# Patient Record
Sex: MALE | Race: ASIAN | NOT HISPANIC OR LATINO | Employment: FULL TIME | URBAN - METROPOLITAN AREA
[De-identification: names, ages, dates, MRNs, and addresses within clinical notes are randomized per-mention and may not be internally consistent; named-entity substitution may affect disease eponyms.]

---

## 2018-10-03 ENCOUNTER — OFFICE VISIT (OUTPATIENT)
Dept: URGENT CARE | Facility: PHYSICIAN GROUP | Age: 58
End: 2018-10-03

## 2018-10-03 VITALS
BODY MASS INDEX: 23.39 KG/M2 | HEART RATE: 64 BPM | HEIGHT: 67 IN | OXYGEN SATURATION: 99 % | SYSTOLIC BLOOD PRESSURE: 140 MMHG | TEMPERATURE: 98.1 F | RESPIRATION RATE: 16 BRPM | DIASTOLIC BLOOD PRESSURE: 82 MMHG | WEIGHT: 149 LBS

## 2018-10-03 DIAGNOSIS — L03.116 CELLULITIS OF LEFT LOWER LEG: ICD-10-CM

## 2018-10-03 DIAGNOSIS — L30.9 ACUTE DERMATITIS: ICD-10-CM

## 2018-10-03 PROCEDURE — 99204 OFFICE O/P NEW MOD 45 MIN: CPT | Performed by: NURSE PRACTITIONER

## 2018-10-03 RX ORDER — CLINDAMYCIN HYDROCHLORIDE 300 MG/1
300 CAPSULE ORAL 3 TIMES DAILY
Qty: 30 CAP | Refills: 0 | Status: SHIPPED | OUTPATIENT
Start: 2018-10-03 | End: 2018-10-13

## 2018-10-03 RX ORDER — TRIAMCINOLONE ACETONIDE 1 MG/G
1 OINTMENT TOPICAL 2 TIMES DAILY
Qty: 1 TUBE | Refills: 0 | Status: SHIPPED | OUTPATIENT
Start: 2018-10-03 | End: 2021-11-26

## 2018-10-03 ASSESSMENT — ENCOUNTER SYMPTOMS
PSYCHIATRIC NEGATIVE: 1
CARDIOVASCULAR NEGATIVE: 1
NEUROLOGICAL NEGATIVE: 1
CONSTITUTIONAL NEGATIVE: 1
GASTROINTESTINAL NEGATIVE: 1

## 2018-10-03 NOTE — PROGRESS NOTES
"Subjective:      Brendon Roberts is a 58 y.o. male who presents with Rash (x3days all overbody, itchy, wound L leg)        HPI    The patient presents to urgent care today with complaints of a wound to his left lower leg for one month. He was scratching his leg and has now turned into a wound. He is concerned it is infected. He denies fever, chills, body aches, or malaise. He has been cleaning daily and covering. He also admits to a rash to his body since onset, to entire body. He denies new foods. He has new shampoo and has tried changing it. He has tried lotion for the rash. Denies sore throat, chest pain, nausea. He otherwise feels well.     History reviewed. No pertinent past medical history.  History reviewed. No pertinent surgical history.   No current outpatient prescriptions on file prior to visit.     No current facility-administered medications on file prior to visit.      Patient has no known allergies.    Review of Systems   Constitutional: Negative.    HENT: Negative.    Cardiovascular: Negative.    Gastrointestinal: Negative.    Genitourinary: Negative.    Skin: Positive for itching and rash.   Neurological: Negative.    Endo/Heme/Allergies: Negative.    Psychiatric/Behavioral: Negative.           Objective:     /82 (BP Location: Left arm)   Pulse 64   Temp 36.7 °C (98.1 °F) (Temporal)   Resp 16   Ht 1.702 m (5' 7\")   Wt 67.6 kg (149 lb)   SpO2 99%   BMI 23.34 kg/m²      Physical Exam   Constitutional: He is oriented to person, place, and time. Vital signs are normal. He appears well-developed and well-nourished. He is active. He does not have a sickly appearance. He does not appear ill. No distress.   HENT:   Head: Normocephalic and atraumatic.   Right Ear: External ear normal.   Left Ear: External ear normal.   Nose: Nose normal.   Mouth/Throat: Oropharynx is clear and moist.   Eyes: Pupils are equal, round, and reactive to light. Conjunctivae are normal. Right eye exhibits no " discharge. Left eye exhibits no discharge.   Neck: Normal range of motion. Neck supple. No JVD present. No tracheal deviation present.   Cardiovascular: Normal rate, regular rhythm, normal heart sounds and intact distal pulses.    Pulmonary/Chest: Effort normal and breath sounds normal. No stridor. No respiratory distress. He has no wheezes.   Musculoskeletal: Normal range of motion. He exhibits tenderness. He exhibits no edema.        Left lower leg: He exhibits tenderness and swelling. He exhibits no bony tenderness, no edema and no deformity.   Lymphadenopathy:     He has no cervical adenopathy.   Neurological: He is alert and oriented to person, place, and time. He has normal strength. No cranial nerve deficit or sensory deficit. He exhibits normal muscle tone. Coordination and gait normal. GCS eye subscore is 4. GCS verbal subscore is 5. GCS motor subscore is 6.   Skin: Skin is warm and intact. Capillary refill takes less than 2 seconds. Rash noted. Rash is maculopapular (scattered to arms, legs, and trunk). He is not diaphoretic. There is erythema. No pallor.        Psychiatric: He has a normal mood and affect. His behavior is normal. Judgment and thought content normal.   Vitals reviewed.              Assessment/Plan:     1. Cellulitis of left lower leg  clindamycin (CLEOCIN) 300 MG Cap   2. Acute dermatitis  triamcinolone acetonide (KENALOG) 0.1 % Ointment           Suspect dermatitis with secondary cellulitis. Refrain from itching. Eliminate new foods/environmental factors which may be cause of allergy. Clindamycin as directed, probiotic use encouraged. Wound care. RTC in 48 hours for re-eval. Kenalog as directed. Supportive care, differential diagnoses, and indications for immediate follow-up discussed with patient.   Pathogenesis of diagnosis discussed including typical length and natural progression.   Instructed to return to clinic or nearest emergency department for any change in condition, further  concerns, or worsening of symptoms.  Patient states understanding of the plan of care and discharge instructions.  Instructed to make an appointment, for follow up, with his primary care provider.        JOSEFA Bazzi.

## 2018-10-03 NOTE — PATIENT INSTRUCTIONS
Cellulitis, Adult  Introduction  Cellulitis is a skin infection. The infected area is usually red and sore. This condition occurs most often in the arms and lower legs. It is very important to get treated for this condition.  Follow these instructions at home:  · Take over-the-counter and prescription medicines only as told by your doctor.  · If you were prescribed an antibiotic medicine, take it as told by your doctor. Do not stop taking the antibiotic even if you start to feel better.  · Drink enough fluid to keep your pee (urine) clear or pale yellow.  · Do not touch or rub the infected area.  · Raise (elevate) the infected area above the level of your heart while you are sitting or lying down.  · Place warm or cold wet cloths (warm or cold compresses) on the infected area. Do this as told by your doctor.  · Keep all follow-up visits as told by your doctor. This is important. These visits let your doctor make sure your infection is not getting worse.  Contact a doctor if:  · You have a fever.  · Your symptoms do not get better after 1-2 days of treatment.  · Your bone or joint under the infected area starts to hurt after the skin has healed.  · Your infection comes back. This can happen in the same area or another area.  · You have a swollen bump in the infected area.  · You have new symptoms.  · You feel ill and also have muscle aches and pains.  Get help right away if:  · Your symptoms get worse.  · You feel very sleepy.  · You throw up (vomit) or have watery poop (diarrhea) for a long time.  · There are red streaks coming from the infected area.  · Your red area gets larger.  · Your red area turns darker.  This information is not intended to replace advice given to you by your health care provider. Make sure you discuss any questions you have with your health care provider.  Document Released: 06/05/2009 Document Revised: 05/25/2017 Document Reviewed: 10/26/2016  © 2017 Elsevier      -Take the antibiotic as  directed (Clindamycin)  -You should take a probiotic in between one of the doses      Clindamycin capsules  What is this medicine?  CLINDAMYCIN (MARILYNN Stewart) is a lincosamide antibiotic. It is used to treat certain kinds of bacterial infections. It will not work for colds, flu, or other viral infections.  This medicine may be used for other purposes; ask your health care provider or pharmacist if you have questions.  COMMON BRAND NAME(S): Cleocin  What should I tell my health care provider before I take this medicine?  They need to know if you have any of these conditions:  -kidney disease  -liver disease  -stomach problems like colitis  -an unusual or allergic reaction to clindamycin, lincomycin, or other medicines, foods, dyes like tartrazine or preservatives  -pregnant or trying to get pregnant  -breast-feeding  How should I use this medicine?  Take this medicine by mouth with a full glass of water. Follow the directions on the prescription label. You can take this medicine with food or on an empty stomach. If the medicine upsets your stomach, take it with food. Take your medicine at regular intervals. Do not take your medicine more often than directed. Take all of your medicine as directed even if you think your are better. Do not skip doses or stop your medicine early.  Talk to your pediatrician regarding the use of this medicine in children. Special care may be needed.  Overdosage: If you think you have taken too much of this medicine contact a poison control center or emergency room at once.  NOTE: This medicine is only for you. Do not share this medicine with others.  What if I miss a dose?  If you miss a dose, take it as soon as you can. If it is almost time for your next dose, take only that dose. Do not take double or extra doses.  What may interact with this medicine?  -birth control pills  -erythromycin  -medicines that relax muscles for surgery  -rifampin  This list may not describe all possible  interactions. Give your health care provider a list of all the medicines, herbs, non-prescription drugs, or dietary supplements you use. Also tell them if you smoke, drink alcohol, or use illegal drugs. Some items may interact with your medicine.  What should I watch for while using this medicine?  Tell your doctor or healthcare professional if your symptoms do not start to get better or if they get worse.  Do not treat diarrhea with over the counter products. Contact your doctor if you have diarrhea that lasts more than 2 days or if it is severe and watery.  What side effects may I notice from receiving this medicine?  Side effects that you should report to your doctor or health care professional as soon as possible:  -allergic reactions like skin rash, itching or hives, swelling of the face, lips, or tongue  -dark urine  -pain on swallowing  -redness, blistering, peeling or loosening of the skin, including inside the mouth  -unusual bleeding or bruising  -unusually weak or tired  -yellowing of eyes or skin  Side effects that usually do not require medical attention (report to your doctor or health care professional if they continue or are bothersome):  -diarrhea  -itching in the rectal or genital area  -joint pain  -nausea, vomiting  -stomach pain  This list may not describe all possible side effects. Call your doctor for medical advice about side effects. You may report side effects to FDA at 3-963-FDA-3045.  Where should I keep my medicine?  Keep out of the reach of children.  Store at room temperature between 20 and 25 degrees C (68 and 77 degrees F). Throw away any unused medicine after the expiration date.  NOTE: This sheet is a summary. It may not cover all possible information. If you have questions about this medicine, talk to your doctor, pharmacist, or health care provider.  © 2018 Elsevier/Gold Standard (2017-03-22 16:34:00)

## 2019-01-02 ENCOUNTER — OFFICE VISIT (OUTPATIENT)
Dept: URGENT CARE | Facility: PHYSICIAN GROUP | Age: 59
End: 2019-01-02
Payer: COMMERCIAL

## 2019-01-02 VITALS
TEMPERATURE: 98.8 F | HEART RATE: 111 BPM | BODY MASS INDEX: 23.07 KG/M2 | HEIGHT: 67 IN | RESPIRATION RATE: 22 BRPM | WEIGHT: 147 LBS | SYSTOLIC BLOOD PRESSURE: 120 MMHG | DIASTOLIC BLOOD PRESSURE: 82 MMHG | OXYGEN SATURATION: 98 %

## 2019-01-02 DIAGNOSIS — R42 DIZZINESS: ICD-10-CM

## 2019-01-02 LAB
APPEARANCE UR: NORMAL
BILIRUB UR STRIP-MCNC: NORMAL MG/DL
COLOR UR AUTO: NORMAL
GLUCOSE BLD-MCNC: 105 MG/DL (ref 70–100)
GLUCOSE UR STRIP.AUTO-MCNC: NEGATIVE MG/DL
KETONES UR STRIP.AUTO-MCNC: NORMAL MG/DL
LEUKOCYTE ESTERASE UR QL STRIP.AUTO: NEGATIVE
NITRITE UR QL STRIP.AUTO: NEGATIVE
PH UR STRIP.AUTO: 5.5 [PH] (ref 5–8)
PROT UR QL STRIP: NORMAL MG/DL
RBC UR QL AUTO: NORMAL
SP GR UR STRIP.AUTO: 1.02
UROBILINOGEN UR STRIP-MCNC: 0.2 MG/DL

## 2019-01-02 PROCEDURE — 81002 URINALYSIS NONAUTO W/O SCOPE: CPT | Performed by: FAMILY MEDICINE

## 2019-01-02 PROCEDURE — 82962 GLUCOSE BLOOD TEST: CPT | Performed by: FAMILY MEDICINE

## 2019-01-02 PROCEDURE — 99213 OFFICE O/P EST LOW 20 MIN: CPT | Performed by: FAMILY MEDICINE

## 2019-01-02 RX ORDER — METFORMIN HYDROCHLORIDE 500 MG/1
1000 TABLET, EXTENDED RELEASE ORAL 2 TIMES DAILY
Refills: 0 | COMMUNITY
Start: 2018-12-13 | End: 2021-11-26

## 2019-01-02 NOTE — LETTER
January 2, 2019         Patient: Brendon Roberts   YOB: 1960   Date of Visit: 1/2/2019           To Whom it May Concern:    Brendon Roberts was seen in my clinic on 1/2/2019. He may return to work on 1/5/2018 unless improved prior..    If you have any questions or concerns, please don't hesitate to call.        Sincerely,           Shaw Oneil M.D.  Electronically Signed

## 2019-01-15 ASSESSMENT — ENCOUNTER SYMPTOMS
CHILLS: 1
VOMITING: 0
SORE THROAT: 0
NAUSEA: 1
FEVER: 0

## 2019-01-15 NOTE — PROGRESS NOTES
"Subjective:   Brendon Roberts is a 58 y.o. male who presents for Chills (x this morning// dizzy// sweating// slight nausea)        Chills   This is a new problem. The current episode started today. The problem occurs constantly. The problem has been rapidly improving. Associated symptoms include chills and nausea. Pertinent negatives include no fever, rash, sore throat or vomiting.     Review of Systems   Constitutional: Positive for chills. Negative for fever.   HENT: Negative for sore throat.    Gastrointestinal: Positive for nausea. Negative for vomiting.   Skin: Negative for rash.     No Known Allergies   Objective:   /82 (BP Location: Left arm, Patient Position: Sitting)   Pulse (!) 111   Temp 37.1 °C (98.8 °F) (Temporal)   Resp (!) 22   Ht 1.702 m (5' 7\")   Wt 66.7 kg (147 lb)   SpO2 98%   BMI 23.02 kg/m²   Physical Exam   Constitutional: He is oriented to person, place, and time. He appears well-developed and well-nourished. No distress.   HENT:   Head: Normocephalic and atraumatic.   Eyes: Pupils are equal, round, and reactive to light. Conjunctivae and EOM are normal.   Neck: Normal range of motion. Neck supple.   Cardiovascular: Normal rate and regular rhythm.    No murmur heard.  Pulmonary/Chest: Effort normal and breath sounds normal. No respiratory distress. He has no wheezes.   Abdominal: Soft. He exhibits no distension. There is no tenderness. There is no rebound and no guarding.   Musculoskeletal: Normal range of motion. He exhibits no edema or tenderness.   Neurological: He is alert and oriented to person, place, and time. He has normal reflexes. No sensory deficit.   Skin: Skin is warm and dry.   Psychiatric: He has a normal mood and affect.         Assessment/Plan:   1. Dizziness  - POCT Glucose  - POCT Urinalysis    Other orders  - metFORMIN ER (GLUCOPHAGE XR) 500 MG TABLET SR 24 HR; Take 1,000 mg by mouth 2 times a day.; Refill: 0  Unclear diagnosis at this point though " symptoms mostly resolved prior to discharge. Patient states that he feels much better. Strict ER precautions given as there is no clear etiology for symptoms.  Differential diagnosis, natural history, supportive care, and indications for immediate follow-up discussed.

## 2020-12-11 ENCOUNTER — HOSPITAL ENCOUNTER (OUTPATIENT)
Dept: LAB | Facility: MEDICAL CENTER | Age: 60
End: 2020-12-11
Payer: COMMERCIAL

## 2020-12-12 LAB
COVID ORDER STATUS COVID19: NORMAL
SARS-COV-2 RNA RESP QL NAA+PROBE: NOTDETECTED
SPECIMEN SOURCE: NORMAL

## 2021-03-15 DIAGNOSIS — Z23 NEED FOR VACCINATION: ICD-10-CM

## 2021-03-31 ENCOUNTER — IMMUNIZATION (OUTPATIENT)
Dept: FAMILY PLANNING/WOMEN'S HEALTH CLINIC | Facility: IMMUNIZATION CENTER | Age: 61
End: 2021-03-31
Attending: INTERNAL MEDICINE
Payer: COMMERCIAL

## 2021-03-31 DIAGNOSIS — Z23 ENCOUNTER FOR VACCINATION: Primary | ICD-10-CM

## 2021-03-31 DIAGNOSIS — Z23 NEED FOR VACCINATION: ICD-10-CM

## 2021-03-31 PROCEDURE — 91300 PFIZER SARS-COV-2 VACCINE: CPT | Performed by: INTERNAL MEDICINE

## 2021-03-31 PROCEDURE — 0001A PFIZER SARS-COV-2 VACCINE: CPT | Performed by: INTERNAL MEDICINE

## 2021-04-22 ENCOUNTER — IMMUNIZATION (OUTPATIENT)
Dept: FAMILY PLANNING/WOMEN'S HEALTH CLINIC | Facility: IMMUNIZATION CENTER | Age: 61
End: 2021-04-22
Payer: COMMERCIAL

## 2021-04-22 DIAGNOSIS — Z23 ENCOUNTER FOR VACCINATION: Primary | ICD-10-CM

## 2021-04-22 PROCEDURE — 0002A PFIZER SARS-COV-2 VACCINE: CPT

## 2021-04-22 PROCEDURE — 91300 PFIZER SARS-COV-2 VACCINE: CPT

## 2021-11-26 ENCOUNTER — OFFICE VISIT (OUTPATIENT)
Dept: URGENT CARE | Facility: PHYSICIAN GROUP | Age: 61
End: 2021-11-26
Payer: COMMERCIAL

## 2021-11-26 VITALS
DIASTOLIC BLOOD PRESSURE: 84 MMHG | BODY MASS INDEX: 23.07 KG/M2 | HEART RATE: 88 BPM | HEIGHT: 67 IN | SYSTOLIC BLOOD PRESSURE: 122 MMHG | TEMPERATURE: 99.9 F | WEIGHT: 147 LBS | RESPIRATION RATE: 14 BRPM | OXYGEN SATURATION: 98 %

## 2021-11-26 DIAGNOSIS — J06.9 ACUTE URI: ICD-10-CM

## 2021-11-26 LAB
EXTERNAL QUALITY CONTROL: NORMAL
SARS-COV+SARS-COV-2 AG RESP QL IA.RAPID: NEGATIVE

## 2021-11-26 PROCEDURE — 99214 OFFICE O/P EST MOD 30 MIN: CPT | Performed by: PHYSICIAN ASSISTANT

## 2021-11-26 PROCEDURE — 87426 SARSCOV CORONAVIRUS AG IA: CPT | Performed by: PHYSICIAN ASSISTANT

## 2021-11-26 ASSESSMENT — ENCOUNTER SYMPTOMS
FEVER: 0
ABDOMINAL PAIN: 0
PALPITATIONS: 0
WHEEZING: 0
VOMITING: 0
COUGH: 1
HEADACHES: 1
DIZZINESS: 0
DIARRHEA: 0
SORE THROAT: 0
BACK PAIN: 1
CHILLS: 0
NAUSEA: 0
SHORTNESS OF BREATH: 0
SINUS PAIN: 1

## 2021-11-26 NOTE — PROGRESS NOTES
"Subjective     Brendon Roberts is a 61 y.o. male who presents with Nasal Congestion (sneezing/ headaches/ sinus pressure x 3 days )    HPI:  Brendon Roberts is a 61 y.o. male who presents  today for 3 days of URI symptoms.  He has had sneezing/nasal congestion, headache, and sinus pressure for the past 3 days.  He also notes a little bit of a dry cough.   He and his wife were doing work on their house earlier in the week and there was a lot of \"dust\" and symptoms started shortly after.  They think that is what is causing the symptoms.        Review of Systems   Constitutional: Positive for malaise/fatigue. Negative for chills and fever.   HENT: Positive for congestion and sinus pain. Negative for sore throat.    Respiratory: Positive for cough. Negative for shortness of breath and wheezing.    Cardiovascular: Negative for chest pain and palpitations.   Gastrointestinal: Negative for abdominal pain, diarrhea, nausea and vomiting.   Musculoskeletal: Positive for back pain.   Neurological: Positive for headaches. Negative for dizziness.         PMH:  has no past medical history on file.  MEDS:   Current Outpatient Medications:   •  metFORMIN ER (GLUCOPHAGE XR) 500 MG TABLET SR 24 HR, Take 1,000 mg by mouth 2 times a day. (Patient not taking: Reported on 11/26/2021), Disp: , Rfl: 0  •  Acetaminophen (TYLENOL 8 HOUR PO), Take  by mouth. (Patient not taking: Reported on 11/26/2021), Disp: , Rfl:   •  triamcinolone acetonide (KENALOG) 0.1 % Ointment, Apply 1 Application to affected area(s) 2 times a day. (Patient not taking: Reported on 1/2/2019), Disp: 1 Tube, Rfl: 0  ALLERGIES: No Known Allergies  SURGHX: No past surgical history on file.  SOCHX:  reports that he has quit smoking. His smoking use included cigarettes. He has never used smokeless tobacco. He reports that he does not drink alcohol and does not use drugs.  FH: Family history was reviewed, no pertinent findings to report      Objective " "    /84 (BP Location: Left arm, Patient Position: Sitting, BP Cuff Size: Adult)   Pulse 88   Temp 37.7 °C (99.9 °F) (Temporal)   Resp 14   Ht 1.702 m (5' 7\")   Wt 66.7 kg (147 lb)   SpO2 98%   BMI 23.02 kg/m²      Physical Exam  Constitutional:       Appearance: He is well-developed.   HENT:      Head: Normocephalic and atraumatic.      Right Ear: Tympanic membrane, ear canal and external ear normal.      Left Ear: Tympanic membrane, ear canal and external ear normal.      Nose: Mucosal edema and congestion present. No rhinorrhea.      Right Sinus: No maxillary sinus tenderness or frontal sinus tenderness.      Left Sinus: No maxillary sinus tenderness or frontal sinus tenderness.      Mouth/Throat:      Lips: Pink.      Mouth: Mucous membranes are moist.      Pharynx: Oropharynx is clear.   Eyes:      Conjunctiva/sclera: Conjunctivae normal.      Pupils: Pupils are equal, round, and reactive to light.   Cardiovascular:      Rate and Rhythm: Normal rate and regular rhythm.      Heart sounds: Normal heart sounds. No murmur heard.      Pulmonary:      Effort: Pulmonary effort is normal.      Breath sounds: Normal breath sounds. No decreased breath sounds, wheezing, rhonchi or rales.   Musculoskeletal:      Cervical back: Normal range of motion.   Lymphadenopathy:      Cervical: No cervical adenopathy.   Skin:     General: Skin is warm and dry.      Capillary Refill: Capillary refill takes less than 2 seconds.   Neurological:      Mental Status: He is alert and oriented to person, place, and time.   Psychiatric:         Behavior: Behavior normal.         Judgment: Judgment normal.             Assessment & Plan     1. Acute URI  - POCT SARS-COV Antigen [facility] Manual Result --> Negative  - OTC cold/flu medications. Also recommend that patient continue to use OTC Claritin and should also add on OTC Flonase as well. Also recommend the use of saline nasal rinses in the use of a cool-mist humidifier in the bedroom " at night. 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 If no significant improvement in symptoms after 3 to 4 days they may return to the urgent care for reevaluation.  - PO fluids  - Rest  - Tylenol or ibuprofen as needed for fever > 100.4 F              My total time spent caring for the patient on the day of the encounter was > 30 minutes.   This does not include time spent on separately billable procedures/tests.                Differential Diagnosis, natural history, and supportive care discussed. Return to the Urgent Care or follow up with your PCP if symptoms fail to resolve, or for any new or worsening symptoms. Emergency room precautions discussed. Patient and/or family appears understanding of information.

## 2021-12-29 ENCOUNTER — OFFICE VISIT (OUTPATIENT)
Dept: URGENT CARE | Facility: PHYSICIAN GROUP | Age: 61
End: 2021-12-29
Payer: COMMERCIAL

## 2021-12-29 VITALS
SYSTOLIC BLOOD PRESSURE: 124 MMHG | OXYGEN SATURATION: 100 % | TEMPERATURE: 98.8 F | WEIGHT: 153.4 LBS | BODY MASS INDEX: 24.08 KG/M2 | HEIGHT: 67 IN | DIASTOLIC BLOOD PRESSURE: 82 MMHG | RESPIRATION RATE: 14 BRPM | HEART RATE: 71 BPM

## 2021-12-29 DIAGNOSIS — U07.1 COVID-19: ICD-10-CM

## 2021-12-29 DIAGNOSIS — M79.10 MYALGIA: ICD-10-CM

## 2021-12-29 DIAGNOSIS — R68.83 CHILLS: ICD-10-CM

## 2021-12-29 LAB
EXTERNAL QUALITY CONTROL: NORMAL
SARS-COV+SARS-COV-2 AG RESP QL IA.RAPID: POSITIVE

## 2021-12-29 PROCEDURE — 87426 SARSCOV CORONAVIRUS AG IA: CPT | Performed by: FAMILY MEDICINE

## 2021-12-29 PROCEDURE — 99213 OFFICE O/P EST LOW 20 MIN: CPT | Mod: CS | Performed by: FAMILY MEDICINE

## 2021-12-29 ASSESSMENT — ENCOUNTER SYMPTOMS
DIARRHEA: 0
EYE REDNESS: 0
HEADACHES: 1
VOMITING: 0
WEIGHT LOSS: 0
EYE DISCHARGE: 0

## 2021-12-30 NOTE — PROGRESS NOTES
"Subjective     Brendon Roberts is a 61 y.o. male who presents with Body Aches (headcahe, chills X 5 days) and Pharyngitis (x 5 days)            Onset 12/25 subjective fever chills, myalgia. ST has improved today.  No cough.  Tolerating fluids with normal urine output.  No loss of taste or smell.  COVID-19 vaccinated without prior infection.  No known exposures.  No other aggravating or alleviating factors.      Review of Systems   Constitutional: Positive for malaise/fatigue ( yesterday). Negative for weight loss.   Eyes: Negative for discharge and redness.   Gastrointestinal: Negative for diarrhea and vomiting.   Musculoskeletal: Negative for joint pain.   Skin: Negative for itching and rash.   Neurological: Positive for headaches (initally now resolved).              Objective     /82 (BP Location: Left arm, Patient Position: Sitting, BP Cuff Size: Adult)   Pulse 71   Temp 37.1 °C (98.8 °F) (Temporal)   Resp 14   Ht 1.702 m (5' 7\")   Wt 69.6 kg (153 lb 6.4 oz)   SpO2 100%   BMI 24.03 kg/m²      Physical Exam  Constitutional:       General: He is not in acute distress.     Appearance: He is well-developed.   HENT:      Head: Normocephalic and atraumatic.   Eyes:      Conjunctiva/sclera: Conjunctivae normal.   Cardiovascular:      Rate and Rhythm: Normal rate and regular rhythm.      Heart sounds: Normal heart sounds. No murmur heard.      Pulmonary:      Effort: Pulmonary effort is normal.      Breath sounds: Normal breath sounds. No wheezing.   Skin:     General: Skin is warm and dry.      Findings: No rash.   Neurological:      Mental Status: He is alert and oriented to person, place, and time.                             Assessment & Plan            1. Myalgia  POCT SARS-COV Antigen [facility]AU (Symptomatic Only)    CANCELED: COVID/SARS CoV-2 PCR   2. Chills  POCT SARS-COV Antigen CHAU (Symptomatic Only)    CANCELED: COVID/SARS CoV-2 PCR   3. COVID-19       Differential diagnosis, natural history, " supportive care, and indications for immediate follow-up discussed at length.     self-isolate per cdc protocol

## 2022-04-13 ENCOUNTER — OFFICE VISIT (OUTPATIENT)
Dept: URGENT CARE | Facility: PHYSICIAN GROUP | Age: 62
End: 2022-04-13
Payer: COMMERCIAL

## 2022-04-13 ENCOUNTER — HOSPITAL ENCOUNTER (OUTPATIENT)
Facility: MEDICAL CENTER | Age: 62
End: 2022-04-13
Attending: PHYSICIAN ASSISTANT
Payer: COMMERCIAL

## 2022-04-13 VITALS
BODY MASS INDEX: 24.11 KG/M2 | HEIGHT: 67 IN | DIASTOLIC BLOOD PRESSURE: 74 MMHG | OXYGEN SATURATION: 98 % | HEART RATE: 92 BPM | RESPIRATION RATE: 18 BRPM | SYSTOLIC BLOOD PRESSURE: 116 MMHG | TEMPERATURE: 97.3 F | WEIGHT: 153.6 LBS

## 2022-04-13 DIAGNOSIS — R52 BODY ACHES: ICD-10-CM

## 2022-04-13 DIAGNOSIS — R09.81 SINUS CONGESTION: ICD-10-CM

## 2022-04-13 DIAGNOSIS — R10.9 ABDOMINAL DISCOMFORT: ICD-10-CM

## 2022-04-13 PROCEDURE — 0240U HCHG SARS-COV-2 COVID-19 NFCT DS RESP RNA 3 TRGT MIC: CPT

## 2022-04-13 PROCEDURE — 99213 OFFICE O/P EST LOW 20 MIN: CPT | Performed by: PHYSICIAN ASSISTANT

## 2022-04-13 ASSESSMENT — ENCOUNTER SYMPTOMS
COUGH: 0
PALPITATIONS: 0
DIZZINESS: 0
DIAPHORESIS: 0
DIARRHEA: 0
SINUS PAIN: 0
WHEEZING: 0
NAUSEA: 0
WEIGHT LOSS: 0
ABDOMINAL PAIN: 1
VOMITING: 0
SORE THROAT: 0
CHILLS: 1
MYALGIAS: 1
SHORTNESS OF BREATH: 0
HEADACHES: 1
FEVER: 0

## 2022-04-13 NOTE — PROGRESS NOTES
Subjective:     CHIEF COMPLAINT  Chief Complaint   Patient presents with   • Sinusitis     Upset stomach since yesterday.        HPI  Brendon Roberts is a 62 y.o. male who presents to the clinic with sinus congestion, body aches and abdominal discomfort x2 days.  Patient states his abdominal discomfort has improved over the last 24 hours.  He is able to eat and drink without any complication.  He does describe a decreased appetite.  He also has generalized body aches, chills and fatigue.  He has not been running a fever.  He states his wife is at home sick with similar symptoms.  He denies any cough, shortness of breath or chest pain.  He has had intermittent headaches for which she has been taking Tylenol which has provided moderate improvement.  No episodes of emesis.  No diarrhea.    REVIEW OF SYSTEMS  Review of Systems   Constitutional: Positive for chills and malaise/fatigue. Negative for diaphoresis, fever and weight loss.   HENT: Positive for congestion. Negative for sinus pain and sore throat.    Respiratory: Negative for cough, shortness of breath and wheezing.    Cardiovascular: Negative for chest pain and palpitations.   Gastrointestinal: Positive for abdominal pain (improved). Negative for diarrhea, nausea and vomiting.   Musculoskeletal: Positive for myalgias.   Skin: Negative for rash.   Neurological: Positive for headaches. Negative for dizziness.       PAST MEDICAL HISTORY  There are no problems to display for this patient.      SURGICAL HISTORY  patient denies any surgical history    ALLERGIES  No Known Allergies    CURRENT MEDICATIONS  Home Medications     Reviewed by Ori Davenport P.A.-C. (Physician Assistant) on 04/13/22 at 1639  Med List Status: <None>   Medication Last Dose Status        Patient Jeremy Taking any Medications                       SOCIAL HISTORY  Social History     Tobacco Use   • Smoking status: Former Smoker     Types: Cigarettes   • Smokeless tobacco: Never Used  "  Vaping Use   • Vaping Use: Never used   Substance and Sexual Activity   • Alcohol use: No   • Drug use: No   • Sexual activity: Not on file       FAMILY HISTORY  History reviewed. No pertinent family history.       Objective:     VITAL SIGNS: /74   Pulse 92   Temp 36.3 °C (97.3 °F)   Resp 18   Ht 1.702 m (5' 7\")   Wt 69.7 kg (153 lb 9.6 oz)   SpO2 98%   BMI 24.06 kg/m²     PHYSICAL EXAM  Physical Exam  Constitutional:       General: He is not in acute distress.     Appearance: Normal appearance. He is not ill-appearing, toxic-appearing or diaphoretic.   HENT:      Head: Normocephalic and atraumatic.      Right Ear: Tympanic membrane, ear canal and external ear normal.      Left Ear: Tympanic membrane, ear canal and external ear normal.      Nose: Nose normal. No congestion or rhinorrhea.      Mouth/Throat:      Mouth: Mucous membranes are moist.      Pharynx: No oropharyngeal exudate or posterior oropharyngeal erythema.   Eyes:      Extraocular Movements: Extraocular movements intact.      Conjunctiva/sclera: Conjunctivae normal.      Pupils: Pupils are equal, round, and reactive to light.   Cardiovascular:      Rate and Rhythm: Normal rate and regular rhythm.      Pulses: Normal pulses.      Heart sounds: Normal heart sounds.   Pulmonary:      Effort: Pulmonary effort is normal.      Breath sounds: Normal breath sounds. No wheezing.   Abdominal:      Palpations: Abdomen is soft.      Tenderness: There is no abdominal tenderness.   Musculoskeletal:      Cervical back: Normal range of motion. No muscular tenderness.   Lymphadenopathy:      Cervical: No cervical adenopathy.   Skin:     General: Skin is warm and dry.      Capillary Refill: Capillary refill takes less than 2 seconds.   Neurological:      Mental Status: He is alert.   Psychiatric:         Mood and Affect: Mood normal.         Thought Content: Thought content normal.         Assessment/Plan:     1. Sinus congestion  - CoV-2 and Flu A/B by " PCR (24 hour In-House): Collect NP swab in V[facility]; Future    2. Body aches  - CoV-2 and Flu A/B by PCR (24 hour In-House): Collect NP swab in VTM; Future    3. Abdominal discomfort  - CoV-2 and Flu A/B by PCR (24 hour In-House): Collect NP swab in VTM; Future      MDM/Comments:    Patient symptoms are consistent with a viral type illness.  We will send out for Covid and influenza testing at this time.  Results will be available in [facility] in the next 24 to 36 hours.  Wayne diet encouraged.  Increase fluid intake.  No signs concerning for bacterial infection.  Vital stable and within normal limits.    Differential diagnosis, natural history, supportive care, and indications for immediate follow-up discussed. All questions answered. Patient agrees with the plan of care.    Follow-up as needed if symptoms worsen or fail to improve to PCP, Urgent care or Emergency Room.    I have personally reviewed prior external notes and test results pertinent to today's visit.  I have independently reviewed and interpreted all diagnostics ordered during this urgent care acute visit.   Discussed management options (risks,benefits, and alternatives to treatment). Pt expresses understanding and the treatment plan was agreed upon. Questions were encouraged and answered to pt's satisfaction.    Please note that this dictation was created using voice recognition software. I have made a reasonable attempt to correct obvious errors, but I expect that there are errors of grammar and possibly content that I did not discover before finalizing the note.

## 2022-04-14 LAB
FLUAV RNA SPEC QL NAA+PROBE: NEGATIVE
FLUBV RNA SPEC QL NAA+PROBE: NEGATIVE
SARS-COV-2 RNA RESP QL NAA+PROBE: NOTDETECTED
SPECIMEN SOURCE: NORMAL

## 2022-06-21 ENCOUNTER — OFFICE VISIT (OUTPATIENT)
Dept: URGENT CARE | Facility: PHYSICIAN GROUP | Age: 62
End: 2022-06-21
Payer: COMMERCIAL

## 2022-06-21 VITALS
TEMPERATURE: 97.2 F | BODY MASS INDEX: 24.27 KG/M2 | RESPIRATION RATE: 14 BRPM | SYSTOLIC BLOOD PRESSURE: 138 MMHG | HEIGHT: 67 IN | DIASTOLIC BLOOD PRESSURE: 76 MMHG | HEART RATE: 66 BPM | WEIGHT: 154.6 LBS | OXYGEN SATURATION: 99 %

## 2022-06-21 DIAGNOSIS — M62.830 BACK MUSCLE SPASM: ICD-10-CM

## 2022-06-21 PROCEDURE — 99213 OFFICE O/P EST LOW 20 MIN: CPT | Performed by: FAMILY MEDICINE

## 2022-06-21 RX ORDER — CYCLOBENZAPRINE HCL 5 MG
5 TABLET ORAL 2 TIMES DAILY PRN
Qty: 10 TABLET | Refills: 0 | Status: SHIPPED | OUTPATIENT
Start: 2022-06-21 | End: 2022-11-04

## 2022-06-21 NOTE — PROGRESS NOTES
"  Subjective:      62 y.o. male presents to urgent care for right, lower back pain that started on Saturday.  There was no inciting event or trauma at that time. The pain is constant, it's described as sharp, currently rated 9/10. He has been using Tylenol which has been helping. Denies numbness or tinging to lower extremities bilaterally.  No loss of bowel or bladder function.    He denies any other questions or concerns at this time.    Current problem list, medication, and past medical/surgical history were reviewed in Epic.    ROS  See HPI     Objective:      /76   Pulse 66   Temp 36.2 °C (97.2 °F)   Resp 14   Ht 1.702 m (5' 7\")   Wt 70.1 kg (154 lb 9.6 oz)   SpO2 99%   BMI 24.21 kg/m²     Physical Exam  Constitutional:       General: He is not in acute distress.     Appearance: He is not diaphoretic.   Cardiovascular:      Rate and Rhythm: Normal rate and regular rhythm.      Heart sounds: Normal heart sounds.   Pulmonary:      Effort: Pulmonary effort is normal. No respiratory distress.      Breath sounds: Normal breath sounds.   Musculoskeletal:      Comments: No discolorations or deformities noted to inspection of back.  No step-offs or areas of tenderness to palpation of spine.  He is tender to palpation of his right, para spinal muscles in the lumbar region, no issues on the left.   Neurological:      Mental Status: He is alert.      Comments: Equal strength and sensation to lower extremities bilaterally.  Straight leg test negative bilaterally.   Psychiatric:         Mood and Affect: Affect normal.         Judgment: Judgment normal.       Assessment/Plan:     1. Back muscle spasm  Heat, Tylenol, and Ibuprofen as needed. I have also sent a prescription for Flexeril. We reviewed different back stretches/exercises.  - cyclobenzaprine (FLEXERIL) 5 mg tablet; Take 1 Tablet by mouth 2 times a day as needed for Moderate Pain or Muscle Spasms.  Dispense: 10 Tablet; Refill: 0      Instructed to return " to Urgent Care or nearest Emergency Department if symptoms fail to improve, for any change in condition, further concerns, or new concerning symptoms. Patient states understanding of the plan of care and discharge instructions.    Mami Garcia M.D.

## 2022-11-04 ENCOUNTER — OFFICE VISIT (OUTPATIENT)
Dept: URGENT CARE | Facility: PHYSICIAN GROUP | Age: 62
End: 2022-11-04
Payer: COMMERCIAL

## 2022-11-04 VITALS
SYSTOLIC BLOOD PRESSURE: 142 MMHG | HEART RATE: 76 BPM | DIASTOLIC BLOOD PRESSURE: 80 MMHG | TEMPERATURE: 98.3 F | RESPIRATION RATE: 16 BRPM | HEIGHT: 67 IN | OXYGEN SATURATION: 99 % | WEIGHT: 150 LBS | BODY MASS INDEX: 23.54 KG/M2

## 2022-11-04 DIAGNOSIS — M54.50 CHRONIC BILATERAL LOW BACK PAIN WITHOUT SCIATICA: ICD-10-CM

## 2022-11-04 DIAGNOSIS — L30.9 DERMATITIS: ICD-10-CM

## 2022-11-04 DIAGNOSIS — G89.29 CHRONIC BILATERAL LOW BACK PAIN WITHOUT SCIATICA: ICD-10-CM

## 2022-11-04 PROCEDURE — 99214 OFFICE O/P EST MOD 30 MIN: CPT | Performed by: NURSE PRACTITIONER

## 2022-11-04 RX ORDER — CYCLOBENZAPRINE HCL 10 MG
10 TABLET ORAL EVERY 8 HOURS PRN
Qty: 30 TABLET | Refills: 0 | Status: SHIPPED | OUTPATIENT
Start: 2022-11-04

## 2022-11-04 RX ORDER — TRIAMCINOLONE ACETONIDE 1 MG/G
1 CREAM TOPICAL 2 TIMES DAILY
Qty: 28.4 G | Refills: 1 | Status: SHIPPED | OUTPATIENT
Start: 2022-11-04 | End: 2022-11-11

## 2022-11-04 RX ORDER — LOSARTAN POTASSIUM 25 MG/1
25 TABLET ORAL
COMMUNITY
Start: 2022-10-11

## 2022-11-04 RX ORDER — METHYLPREDNISOLONE 4 MG/1
TABLET ORAL
Qty: 1 EACH | Refills: 0 | Status: SHIPPED | OUTPATIENT
Start: 2022-11-04

## 2022-11-04 ASSESSMENT — ENCOUNTER SYMPTOMS
SENSORY CHANGE: 0
PERIANAL NUMBNESS: 0
NEUROLOGICAL NEGATIVE: 1
WEAKNESS: 0
LEG SWELLING: 1
BACK PAIN: 1
CONSTITUTIONAL NEGATIVE: 1
GASTROINTESTINAL NEGATIVE: 1
BOWEL INCONTINENCE: 0

## 2022-11-04 ASSESSMENT — VISUAL ACUITY: OU: 1

## 2022-11-04 NOTE — LETTER
November 4, 2022         Patient: Brendon Roberts   YOB: 1960   Date of Visit: 11/4/2022           To Whom it May Concern:    Brendon Roberts was seen in my clinic on 11/4/2022 due to illness. Due to medical necessity, please excuse patient from work 11/4.       If you have any questions or concerns, please don't hesitate to call.        Sincerely,         JOSEFA Saul.  Electronically Signed

## 2022-11-04 NOTE — PROGRESS NOTES
Subjective:     Brendon Roberts is a 62 y.o. male who presents for Back Pain (X2 days, feels like muscle spasm ) and Leg Swelling (Left lower leg area itches )       Back Pain  This is a chronic problem. The problem has been gradually worsening since onset. The pain is present in the lumbar spine. Pertinent negatives include no bladder incontinence, bowel incontinence, perianal numbness or weakness.   Leg Swelling  Associated symptoms include a rash. Pertinent negatives include no weakness.     Patient reporting reoccurring lower back pain.  Reports pain worsens with range of motion.  Feels like a muscle spasm.    Seen in urgent care 6/21/2022.  Was seen for lower back pain without injury.  Diagnosed with back muscle spasm and treated with Flexeril.  Advised Tylenol and ibuprofen.    Has tried massage as well as Tylenol with no improvement in symptoms.  Denies injury.  Has to use a cane at the moment to help him walk.    Reports chronic skin swelling and itchiness at the front of his left lower leg.  Reports about a year ago, was bitten multiple times by some bees.  Has had itchy rash at the left lower leg ever since.  Has tried lotion on it.  Admits to scratching it.    Review of Systems   Constitutional: Negative.    Gastrointestinal: Negative.  Negative for bowel incontinence.   Genitourinary: Negative.  Negative for bladder incontinence.   Musculoskeletal:  Positive for back pain.   Skin:  Positive for itching and rash.   Neurological: Negative.  Negative for sensory change and weakness.   All other systems reviewed and are negative.    Refer to HPI for additional details.    During this visit, appropriate PPE was worn, hand hygiene was performed, and the patient and any visitors were masked.    PMH:  has no past medical history on file.    MEDS:   Current Outpatient Medications:     losartan (COZAAR) 25 MG Tab, Take 25 mg by mouth every day., Disp: , Rfl:     methylPREDNISolone (MEDROL DOSEPAK) 4 MG  "Tablet Therapy Pack, Use as directed on package. May take all of Day 1 as a single dose (24 mg) when starting., Disp: 1 Each, Rfl: 0    triamcinolone acetonide (KENALOG) 0.1 % Cream, Apply 1 Application topically 2 times a day for 7 days., Disp: 28.4 g, Rfl: 1    cyclobenzaprine (FLEXERIL) 10 mg Tab, Take 1 Tablet by mouth every 8 hours as needed for Muscle Spasms., Disp: 30 Tablet, Rfl: 0    ALLERGIES:   Allergies   Allergen Reactions    Alendronate-Cholecalciferol Rash     SURGHX: History reviewed. No pertinent surgical history.  SOCHX:  reports that he has quit smoking. His smoking use included cigarettes. He has never used smokeless tobacco. He reports that he does not drink alcohol and does not use drugs.    FH: Per HPI as applicable/pertinent.      Objective:     BP (!) 142/80   Pulse 76   Temp 36.8 °C (98.3 °F) (Temporal)   Resp 16   Ht 1.702 m (5' 7\")   Wt 68 kg (150 lb)   SpO2 99%   BMI 23.49 kg/m²     Physical Exam  Nursing note reviewed.   Constitutional:       General: He is not in acute distress.     Appearance: He is well-developed. He is not ill-appearing or toxic-appearing.   Eyes:      General: Vision grossly intact.   Cardiovascular:      Rate and Rhythm: Normal rate.   Pulmonary:      Effort: Pulmonary effort is normal. No respiratory distress.   Musculoskeletal:         General: No deformity.      Lumbar back: Spasms present. No swelling, deformity, lacerations or tenderness. Decreased range of motion.      Left lower leg: No deformity, lacerations or tenderness.        Legs:       Comments: Mild lichenification, thickening, darkening of skin of anterior left lower leg, no erythema, no discharge   Skin:     General: Skin is warm and dry.      Coloration: Skin is not pale.      Findings: Rash present.   Neurological:      Mental Status: He is alert and oriented to person, place, and time.      Motor: No weakness.      Gait: Gait abnormal (Antalgic).   Psychiatric:         Behavior: Behavior " normal. Behavior is cooperative.       Assessment/Plan:     1. Chronic bilateral low back pain without sciatica  - methylPREDNISolone (MEDROL DOSEPAK) 4 MG Tablet Therapy Pack; Use as directed on package. May take all of Day 1 as a single dose (24 mg) when starting.  Dispense: 1 Each; Refill: 0  - cyclobenzaprine (FLEXERIL) 10 mg Tab; Take 1 Tablet by mouth every 8 hours as needed for Muscle Spasms.  Dispense: 30 Tablet; Refill: 0  - Referral to Sports Medicine    2. Dermatitis  - methylPREDNISolone (MEDROL DOSEPAK) 4 MG Tablet Therapy Pack; Use as directed on package. May take all of Day 1 as a single dose (24 mg) when starting.  Dispense: 1 Each; Refill: 0  - triamcinolone acetonide (KENALOG) 0.1 % Cream; Apply 1 Application topically 2 times a day for 7 days.  Dispense: 28.4 g; Refill: 1    Rx as above sent electronically. Alternate heat/ice. Rest. Work note provided. Follow up with sports medicine. Avoid scratching at rash. Follow up with PCP    Differential diagnosis, natural history, supportive care, over-the-counter symptom management per 's instructions, close monitoring, and indications for immediate follow-up discussed.     All questions answered. Patient agrees with the plan of care.    Discharge summary provided.     Billing note: chronic illness with exacerbation/progression; prescription drug management

## 2023-04-17 ENCOUNTER — TELEPHONE (OUTPATIENT)
Dept: HEALTH INFORMATION MANAGEMENT | Facility: OTHER | Age: 63
End: 2023-04-17
Payer: COMMERCIAL

## 2023-04-17 NOTE — TELEPHONE ENCOUNTER
OUTCOME: LEFT VM TO [facility] REFERR[facility].     PLEASE TRANSFER TO [facility] [phone] WHEN PT RETURNS CALL.     ATTEMPT # 1.

## 2024-09-18 ENCOUNTER — OFFICE VISIT (OUTPATIENT)
Dept: DERMATOLOGY | Facility: IMAGING CENTER | Age: 64
End: 2024-09-18
Payer: COMMERCIAL

## 2024-09-18 DIAGNOSIS — R21 RASH AND NONSPECIFIC SKIN ERUPTION: ICD-10-CM

## 2024-09-18 PROCEDURE — 99213 OFFICE O/P EST LOW 20 MIN: CPT | Mod: 25 | Performed by: NURSE PRACTITIONER

## 2024-09-18 PROCEDURE — 11104 PUNCH BX SKIN SINGLE LESION: CPT | Performed by: NURSE PRACTITIONER

## 2024-09-18 RX ORDER — TRIAMCINOLONE ACETONIDE 1 MG/G
CREAM TOPICAL
Qty: 454 G | Refills: 1 | Status: SHIPPED | OUTPATIENT
Start: 2024-09-18

## 2024-09-18 NOTE — PROGRESS NOTES
DERMATOLOGY NOTE  NEW VISIT       Chief complaint: Establish Care and Rash     HPI:Rash   Onset: all over body & back of scalp   Symptoms: itchy  Aggravating factors: when using restroom  Alleviating factors: no  New creams/topicals: cervae, Nizoral,  New medications (up to last 6 months): xigduo XR 10, hydroxyzine  Treatments: no          History of skin cancer: No  History of precancers/actinic keratoses: No  History of biopsies:No  History of blistering/severe sunburns:No  Family history of skin cancer:No  Family history of atypical moles:No      Allergies   Allergen Reactions    Alendronate-Cholecalciferol Rash        MEDICATIONS:  Medications relevant to specialty reviewed.     REVIEW OF SYSTEMS:   Positive for skin (see HPI)  Negative for fevers and chills       EXAM:  There were no vitals taken for this visit.  Constitutional: Well-developed, well-nourished, and in no distress.     A focused skin exam was performed including the affected areas of the trunk and extremities. Notable findings on exam today listed below and/or in assessment/plan.     Numerous pink and erythematous papules and nodules in varying stages of healing to distal extremities and back    IMPRESSION / PLAN:    1. Rash and nonspecific skin eruption  Procedure Note   Procedure: Biopsy by punch technique  Location: R distal anterior thigh  Preoperative diagnosis: nodular prurigo vs other  Risks, benefits and alternatives of procedure discussed and written informed consent obtained. Time out completed. Area of biopsy prepped with alcohol. Anesthesia with 1% lidocaine with epinephrine administered with a 30 gauge needle. 4  mm punch biopsy of site performed. Hemostasis achieved with pressure and 4.0 prolene sutures. Vaseline applied to wound with bandage. Patient tolerated procedure well, and there were no complications.  The pathology specimen was sent to the lab via the staff.  Wound care was discussed with the patient.   Rx below, follow up  3 weeks  - triamcinolone acetonide (KENALOG) 0.1 % Cream; AAA twice a day for 2 weeks at a time with 1-2 week break in between  Dispense: 454 g; Refill: 1      Discussed risks, benefits, alternative treatments as well as common side effects associated with prescribed treatment, Patient verbalized understanding and agrees with plan regarding the above            Please note that this dictation was created using voice recognition software. I have made every reasonable attempt to correct obvious errors, but I expect that there are errors of grammar and possibly content that I did not discover before finalizing the note.      Return to clinic in: Return in about 3 weeks (around 10/9/2024) for Rash follow up. and as needed for any new or changing skin lesions.

## 2024-09-25 ENCOUNTER — NON-PROVIDER VISIT (OUTPATIENT)
Dept: DERMATOLOGY | Facility: IMAGING CENTER | Age: 64
End: 2024-09-25
Payer: COMMERCIAL

## 2024-09-25 NOTE — PROGRESS NOTES
Brendon Roberts is a 64 y.o. male here for a Non-Provider Visit for Suture Removal.    Sutures were placed by Swetha Gardner on date: 09/18/2024  Skin is healed: Yes  Provider notified if skin is not healed, or if there is redness, heat, pain, or drainage from incision: yes  Sutures removed.   Mastisol and steristips are placed: Yes    Advised to use emollient (vaseline, aquaphor, etc.) as needed, avoid peroxide and antibiotic ointment to reduce irritation.     Path report has been reviewed by provider.  Path report has reviewed with patient.

## 2024-09-25 NOTE — PROGRESS NOTES
CC: ***    Subjective: *** Previously seen patient here for    ROS: no fevers/chills. No itch.  No joint pain  DermPMH: no skin cancer/melanoma  No problem-specific Assessment & Plan notes found for this encounter.    Relevant PMH:  Social:    PE: Gen:MANOJ male in NAD.       A/P:    I have reviewed medications relevant to my specialty.

## 2024-12-04 ENCOUNTER — OFFICE VISIT (OUTPATIENT)
Dept: DERMATOLOGY | Facility: IMAGING CENTER | Age: 64
End: 2024-12-04
Payer: COMMERCIAL

## 2024-12-04 DIAGNOSIS — R21 RASH AND NONSPECIFIC SKIN ERUPTION: ICD-10-CM

## 2024-12-04 PROCEDURE — 99213 OFFICE O/P EST LOW 20 MIN: CPT | Performed by: NURSE PRACTITIONER

## 2024-12-04 RX ORDER — PREDNISONE 20 MG/1
TABLET ORAL
Qty: 20 TABLET | Refills: 0 | Status: SHIPPED | OUTPATIENT
Start: 2024-12-04

## 2024-12-04 NOTE — PROGRESS NOTES
DERMATOLOGY NOTE  Follow up VISIT       Chief complaint: Follow-Up (flare of prurigo nodularis)    Flare up prurigo nodularis bilateral forearms ,states started after receiving flu vaccine . Currently just applying triamcinolone        Initial Hx   HPI:Rash   Onset: all over body & back of scalp   Symptoms: itchy  Aggravating factors: when using restroom  Alleviating factors: no  New creams/topicals: cervae, Nizoral,  New medications (up to last 6 months): xigduo XR 10, hydroxyzine  Treatments: no          History of skin cancer: No  History of precancers/actinic keratoses: No  History of biopsies:No  History of blistering/severe sunburns:No  Family history of skin cancer:No  Family history of atypical moles:No      Allergies   Allergen Reactions    Alendronate-Cholecalciferol Rash        MEDICATIONS:  Medications relevant to specialty reviewed.     REVIEW OF SYSTEMS:   Positive for skin (see HPI)  Negative for fevers and chills       EXAM:  There were no vitals taken for this visit.  Constitutional: Well-developed, well-nourished, and in no distress.     A focused skin exam was performed including the affected areas of the trunk and extremities. Notable findings on exam today listed below and/or in assessment/plan.     Numerous pink, violaceous and erythematous papules and nodules in varying stages of healing to distal extremities and trunk    IMPRESSION / PLAN:    1. Rash and nonspecific skin eruption, prurigo nodularis confirmed on bx in Sept. 2024  Has been using TAC and seemed to be improving. Flare started after recent flu shot  Rx below  Close follow up 2-3 weeks, will discuss other tx options  - predniSONE (DELTASONE) 20 MG Tab; Take 3 pills day 1 and 2, take 2 pills day 3 and 4, take 1 pill day 5, then 1 pill every other day until gone  Dispense: 20 Tablet; Refill: 0      Discussed risks, benefits, alternative treatments as well as common side effects associated with prescribed treatment, Patient verbalized  understanding and agrees with plan regarding the above            Please note that this dictation was created using voice recognition software. I have made every reasonable attempt to correct obvious errors, but I expect that there are errors of grammar and possibly content that I did not discover before finalizing the note.      Return to clinic in: Return in about 3 weeks (around 12/25/2024) for Rash follow up. and as needed for any new or changing skin lesions.

## 2024-12-18 ENCOUNTER — HOSPITAL ENCOUNTER (OUTPATIENT)
Dept: LAB | Facility: MEDICAL CENTER | Age: 64
End: 2024-12-18
Attending: NURSE PRACTITIONER
Payer: COMMERCIAL

## 2024-12-18 ENCOUNTER — OFFICE VISIT (OUTPATIENT)
Dept: DERMATOLOGY | Facility: IMAGING CENTER | Age: 64
End: 2024-12-18
Payer: COMMERCIAL

## 2024-12-18 DIAGNOSIS — L28.1 PRURIGO NODULARIS: ICD-10-CM

## 2024-12-18 DIAGNOSIS — Z79.899 HIGH RISK MEDICATION USE: ICD-10-CM

## 2024-12-18 LAB
BASOPHILS # BLD AUTO: 0.5 % (ref 0–1.8)
BASOPHILS # BLD: 0.03 K/UL (ref 0–0.12)
EOSINOPHIL # BLD AUTO: 0.12 K/UL (ref 0–0.51)
EOSINOPHIL NFR BLD: 2 % (ref 0–6.9)
ERYTHROCYTE [DISTWIDTH] IN BLOOD BY AUTOMATED COUNT: 43.1 FL (ref 35.9–50)
HCT VFR BLD AUTO: 45.7 % (ref 42–52)
HGB BLD-MCNC: 15.6 G/DL (ref 14–18)
IMM GRANULOCYTES # BLD AUTO: 0.02 K/UL (ref 0–0.11)
IMM GRANULOCYTES NFR BLD AUTO: 0.3 % (ref 0–0.9)
LYMPHOCYTES # BLD AUTO: 0.78 K/UL (ref 1–4.8)
LYMPHOCYTES NFR BLD: 12.9 % (ref 22–41)
MCH RBC QN AUTO: 31.2 PG (ref 27–33)
MCHC RBC AUTO-ENTMCNC: 34.1 G/DL (ref 32.3–36.5)
MCV RBC AUTO: 91.4 FL (ref 81.4–97.8)
MONOCYTES # BLD AUTO: 0.22 K/UL (ref 0–0.85)
MONOCYTES NFR BLD AUTO: 3.6 % (ref 0–13.4)
NEUTROPHILS # BLD AUTO: 4.89 K/UL (ref 1.82–7.42)
NEUTROPHILS NFR BLD: 80.7 % (ref 44–72)
NRBC # BLD AUTO: 0 K/UL
NRBC BLD-RTO: 0 /100 WBC (ref 0–0.2)
PLATELET # BLD AUTO: 214 K/UL (ref 164–446)
PMV BLD AUTO: 9.7 FL (ref 9–12.9)
RBC # BLD AUTO: 5 M/UL (ref 4.7–6.1)
WBC # BLD AUTO: 6.1 K/UL (ref 4.8–10.8)

## 2024-12-18 PROCEDURE — 99213 OFFICE O/P EST LOW 20 MIN: CPT | Performed by: NURSE PRACTITIONER

## 2024-12-18 PROCEDURE — 85025 COMPLETE CBC W/AUTO DIFF WBC: CPT

## 2024-12-18 PROCEDURE — 86480 TB TEST CELL IMMUN MEASURE: CPT

## 2024-12-18 PROCEDURE — 36415 COLL VENOUS BLD VENIPUNCTURE: CPT

## 2024-12-18 RX ORDER — FINASTERIDE 5 MG/1
5 TABLET, FILM COATED ORAL DAILY
COMMUNITY

## 2024-12-18 RX ORDER — METFORMIN HYDROCHLORIDE 500 MG/1
1000 TABLET, EXTENDED RELEASE ORAL
COMMUNITY
Start: 2024-12-10

## 2024-12-18 RX ORDER — HYDROXYZINE HYDROCHLORIDE 25 MG/1
25 TABLET, FILM COATED ORAL 3 TIMES DAILY PRN
COMMUNITY

## 2024-12-18 NOTE — PROGRESS NOTES
DERMATOLOGY NOTE  Follow up VISIT       Chief complaint: Follow-Up (flare of prurigo nodularis)  Pt states symptoms have slightly improved, but still itching and having breakouts, but not as bad.              Flare up prurigo nodularis bilateral forearms ,states started after receiving flu vaccine . Currently just applying triamcinolone        Initial Hx   HPI:Rash   Onset: all over body & back of scalp   Symptoms: itchy  Aggravating factors: when using restroom  Alleviating factors: no  New creams/topicals: cervae, Nizoral,  New medications (up to last 6 months): xigduo XR 10, hydroxyzine  Treatments: no          History of skin cancer: No  History of precancers/actinic keratoses: No  History of biopsies:No  History of blistering/severe sunburns:No  Family history of skin cancer:No  Family history of atypical moles:No      Allergies   Allergen Reactions    Alendronate-Cholecalciferol Rash        MEDICATIONS:  Medications relevant to specialty reviewed.     REVIEW OF SYSTEMS:   Positive for skin (see HPI)  Negative for fevers and chills       EXAM:  There were no vitals taken for this visit.  Constitutional: Well-developed, well-nourished, and in no distress.     A focused skin exam was performed including the affected areas of the trunk and extremities. Notable findings on exam today listed below and/or in assessment/plan.     Numerous pink, violaceous and erythematous papules and nodules in varying stages of healing to distal extremities and trunk    IMPRESSION / PLAN:    1. Prurigo nodularis affecting >60% BSA  Confirmed on Bx  Extensively discussed course/nature of disease, shared content from [facility]  Has been using TCS, mid range to ultra potent, oral steroid, sedating antihistamines with minimal improvement  Due to large BSA and minimal improvement with supportive and prescribed therapy, recommend systemic tx, will start PA on dupilumab--discussed R/B/AEs  Baseline labs ordered  Advised to continue with TCS  and other supportive measures  Advised follow up pending PA    2. High risk medication use    - CBC WITH DIFFERENTIAL; Future  - Quantiferon Gold TB (PPD); Future        Discussed risks, benefits, alternative treatments as well as common side effects associated with prescribed treatment, Patient verbalized understanding and agrees with plan regarding the above            Please note that this dictation was created using voice recognition software. I have made every reasonable attempt to correct obvious errors, but I expect that there are errors of grammar and possibly content that I did not discover before finalizing the note.      Return to clinic in: Return for pending PA for dupilumab. and as needed for any new or changing skin lesions.

## 2024-12-19 ENCOUNTER — TELEPHONE (OUTPATIENT)
Dept: DERMATOLOGY | Facility: IMAGING CENTER | Age: 64
End: 2024-12-19
Payer: COMMERCIAL

## 2024-12-19 DIAGNOSIS — L28.1 PRURIGO NODULARIS: ICD-10-CM

## 2024-12-19 LAB
GAMMA INTERFERON BACKGROUND BLD IA-ACNC: 0.04 IU/ML
M TB IFN-G BLD-IMP: NEGATIVE
M TB IFN-G CD4+ BCKGRND COR BLD-ACNC: 0 IU/ML
MITOGEN IGNF BCKGRD COR BLD-ACNC: 5.82 IU/ML
QFT TB2 - NIL TBQ2: 0 IU/ML

## 2024-12-19 NOTE — TELEPHONE ENCOUNTER
Prior authorization was started and approved through [facility] insurance . Approval letter scanned in media tab . Can you send Rx to [facility]  Rx  speciality pharmacy

## 2024-12-26 DIAGNOSIS — L28.1 PRURIGO NODULARIS: ICD-10-CM

## 2025-01-03 ENCOUNTER — TELEPHONE (OUTPATIENT)
Dept: INTERNAL MEDICINE | Facility: IMAGING CENTER | Age: 65
End: 2025-01-03
Payer: COMMERCIAL

## 2025-01-10 ENCOUNTER — TELEPHONE (OUTPATIENT)
Dept: DERMATOLOGY | Facility: IMAGING CENTER | Age: 65
End: 2025-01-10
Payer: COMMERCIAL

## 2025-01-10 NOTE — TELEPHONE ENCOUNTER
Pt's wife called and stating [facility] has been giving her a hard time filling the Dupixent inj.   I told her I would call [facility] to see what specialty pharm they prefer,     Called [facility] and their preference specialty pharm is [facility], since Swetha is out of office I have faxed over Prescri[ption/ approval letter pt's [employer]. (See media tab)     Called pt's wife to let her know I have fax over Dupixent prescription to [facility] as STAT, to let her know if she does not hear from them. Weill follow up Monday w/ Pt and pharm.

## 2025-01-16 NOTE — TELEPHONE ENCOUNTER
Pt's wife called stating [facility] told her PA was needed. Approval letter was sent to pharm on 01/10/24. Will call pharm to follow up.

## 2025-01-16 NOTE — TELEPHONE ENCOUNTER
Per pharmacy delivered will be scheduled copay 0$     Called pt's wife to notify her to expect a call from pharm. Pt's wife was on the line with pharm.

## 2025-01-16 NOTE — TELEPHONE ENCOUNTER
Got off phone with pharmacy and they stated insurance went through pt has a copay of 3000, I mentioned pt has copay and they stated pt needs to call to provide the info.

## 2025-01-16 NOTE — TELEPHONE ENCOUNTER
Spoke to pt's wife and they stated they already gave the pharm the copay card info to pharm.     Bin [id number]  [facility] [id number]   ISSEUR: [id number]   ID  [id number]     I told pt's wife I would call pharm and provide the the copay card info so we can try to get medication today or this week since pt has been waiting about a month for the med

## 2025-01-17 ENCOUNTER — NON-PROVIDER VISIT (OUTPATIENT)
Dept: DERMATOLOGY | Facility: IMAGING CENTER | Age: 65
End: 2025-01-17
Payer: COMMERCIAL

## 2025-01-17 VITALS — RESPIRATION RATE: 12 BRPM | SYSTOLIC BLOOD PRESSURE: 118 MMHG | DIASTOLIC BLOOD PRESSURE: 72 MMHG | HEART RATE: 76 BPM

## 2025-01-17 NOTE — PROGRESS NOTES
Brendon Roberts is a 64 y.o. male here for a non-provider visit for Dupixent injection.   600q sub bilaterally thighs -loading dose     Reason for injection: prurigo nodularis  Order in MAR?: No  Patient supplied?:Yes    Patient tolerated injection and no adverse effects were observed or reported: Yes, pt kept for 20 mins         Physician note:     Called to assess patient after injection of Dupixent for symptom of blurry vision.   Patient reports onset of blurry vision starting earlier today - attributes to elevated blood sugar - 206, which by report has happened to him in the past. Reports improvement since first onset.     Denies other symptoms. Otherwise feeling well.    VS: WNL: Gen WDWN male in NAD. Awake, alert, talkative. O: unable to test visual acuity in clinics. EOM 3, 4, 6, 5, 7 appearing intact. Strength intact. Ambulates well.     A/P: blurry vision, improving, by report: not likely related to dupixent due to timing and hx  -reviewed potential dx and reviewed anticipatory guidance for escalated care- UC/911.   -discharged to home to f/u with PCP

## 2025-01-31 ENCOUNTER — NON-PROVIDER VISIT (OUTPATIENT)
Dept: DERMATOLOGY | Facility: IMAGING CENTER | Age: 65
End: 2025-01-31
Payer: COMMERCIAL

## 2025-01-31 NOTE — PROGRESS NOTES
Brendon Hero Roberts is a 64 y.o. male here for a non-provider visit for Dupixent 300mg injection over Rt Thigh    Reason for injection: prurigo nodularis   Order in MAR?: No  Patient supplied?:Yes      Patient tolerated injection and no adverse effects were observed or reported: Yes

## 2025-02-14 ENCOUNTER — NON-PROVIDER VISIT (OUTPATIENT)
Dept: DERMATOLOGY | Facility: IMAGING CENTER | Age: 65
End: 2025-02-14
Payer: COMMERCIAL

## 2025-03-18 ENCOUNTER — APPOINTMENT (OUTPATIENT)
Dept: DERMATOLOGY | Facility: IMAGING CENTER | Age: 65
End: 2025-03-18
Payer: COMMERCIAL

## 2025-03-18 DIAGNOSIS — L28.1 PRURIGO NODULARIS: ICD-10-CM

## 2025-03-18 PROCEDURE — 99212 OFFICE O/P EST SF 10 MIN: CPT | Performed by: NURSE PRACTITIONER

## 2025-05-15 ENCOUNTER — TELEPHONE (OUTPATIENT)
Dept: DERMATOLOGY | Facility: IMAGING CENTER | Age: 65
End: 2025-05-15
Payer: COMMERCIAL

## 2025-07-15 ENCOUNTER — TELEPHONE (OUTPATIENT)
Dept: DERMATOLOGY | Facility: IMAGING CENTER | Age: 65
End: 2025-07-15
Payer: COMMERCIAL